# Patient Record
Sex: FEMALE | Race: WHITE | URBAN - METROPOLITAN AREA
[De-identification: names, ages, dates, MRNs, and addresses within clinical notes are randomized per-mention and may not be internally consistent; named-entity substitution may affect disease eponyms.]

---

## 2019-08-01 ENCOUNTER — APPOINTMENT (RX ONLY)
Dept: URBAN - METROPOLITAN AREA CLINIC 113 | Facility: CLINIC | Age: 64
Setting detail: DERMATOLOGY
End: 2019-08-01

## 2019-08-01 DIAGNOSIS — L30.4 ERYTHEMA INTERTRIGO: ICD-10-CM

## 2019-08-01 DIAGNOSIS — L81.5 LEUKODERMA, NOT ELSEWHERE CLASSIFIED: ICD-10-CM

## 2019-08-01 DIAGNOSIS — L23.9 ALLERGIC CONTACT DERMATITIS, UNSPECIFIED CAUSE: ICD-10-CM | Status: WELL CONTROLLED

## 2019-08-01 PROBLEM — E78.5 HYPERLIPIDEMIA, UNSPECIFIED: Status: ACTIVE | Noted: 2019-08-01

## 2019-08-01 PROBLEM — E03.9 HYPOTHYROIDISM, UNSPECIFIED: Status: ACTIVE | Noted: 2019-08-01

## 2019-08-01 PROBLEM — J45.909 UNSPECIFIED ASTHMA, UNCOMPLICATED: Status: ACTIVE | Noted: 2019-08-01

## 2019-08-01 PROCEDURE — ? GENTLE SKIN CARE INSTRUCTIONS

## 2019-08-01 PROCEDURE — ? COUNSELING

## 2019-08-01 PROCEDURE — ? PRESCRIPTION

## 2019-08-01 PROCEDURE — ? TREATMENT REGIMEN

## 2019-08-01 PROCEDURE — 99214 OFFICE O/P EST MOD 30 MIN: CPT

## 2019-08-01 PROCEDURE — ? DIAGNOSIS COMMENT

## 2019-08-01 PROCEDURE — ? SUNSCREEN RECOMMENDATIONS

## 2019-08-01 RX ORDER — TRIAMCINOLONE ACETONIDE 1 MG/G
OINTMENT TOPICAL
Qty: 1 | Refills: 2 | Status: ERX | COMMUNITY
Start: 2019-08-01

## 2019-08-01 RX ORDER — TRIAMCINOLONE ACETONIDE 0.25 MG/G
OINTMENT TOPICAL BID
Qty: 2 | Refills: 3 | Status: ERX | COMMUNITY
Start: 2019-08-01

## 2019-08-01 RX ADMIN — TRIAMCINOLONE ACETONIDE: 1 OINTMENT TOPICAL at 15:39

## 2019-08-01 RX ADMIN — TRIAMCINOLONE ACETONIDE: 0.25 OINTMENT TOPICAL at 15:40

## 2019-08-01 ASSESSMENT — LOCATION SIMPLE DESCRIPTION DERM
LOCATION SIMPLE: LEFT FOREARM
LOCATION SIMPLE: RIGHT FOREARM

## 2019-08-01 ASSESSMENT — LOCATION DETAILED DESCRIPTION DERM
LOCATION DETAILED: LEFT PROXIMAL DORSAL FOREARM
LOCATION DETAILED: RIGHT PROXIMAL DORSAL FOREARM

## 2019-08-01 ASSESSMENT — LOCATION ZONE DERM: LOCATION ZONE: ARM

## 2019-08-01 NOTE — PROCEDURE: DIAGNOSIS COMMENT
Detail Level: Zone
Comment: Currently well controlled with gentle skin care, avoiding known allergens, and topical steroids as needed. Patient has avoided all positive contact allergens since last patch testing in 01/2018. Patient noticed significant decrease in flares since then. However, still occasionally flares and needs refills of topical steroids to treat. \\n\\nRefills of triamcinolone 0.1% ointment given today to use on body and triamcinolone 0.025% to use on face, genitals, and axillae.
Comment: Clear on exam. May treat with Triple Paste BID to affected area and triamcinolone 0.025% when flaring.

## 2019-08-01 NOTE — PROCEDURE: TREATMENT REGIMEN
Continue Regimen: Triamcinolone 0.1% ointment apply to affected area on body twice daily for 2 weeks on, 1 week off. Repeat as needed. Avoid the face, groin, axillae, and breasts.
Initiate Treatment: Triamcinolone 0.025% ointment apply to affected area on face twice daily for no longer than 2 weeks on, 1 week off.
Detail Level: Zone
Otc Regimen: Triple paste AF to be applied once every night.

## 2023-05-08 NOTE — HPI: RASH (ALLERGIC CONTACT DERMATITIS)
4 year WELL CHILD EXAM     Yoselin is a 4 year 6 months old  female child     History given by mom     CONCERNS/QUESTIONS: No, dry skin and using aveno but doesn't seem to help     IMMUNIZATION: up to date and documented     NUTRITION HISTORY:   Vegetables? Yes  Fruits? Yes  Meats? Yes  Juice? Yes, 6 oz per day   Water? Yes  Milk? Yes, Type: 1%,  8 oz per day    MULTIVITAMIN: Yes    ELIMINATION:   Has good urine output and BM's are soft? Yes    SLEEP PATTERN:   Easy to fall asleep? Yes  Sleeps through the night? Yes      SOCIAL HISTORY:   The patient lives at home with parents, and does not  attend day care/. Has 1  siblings.  Smokers at home? No  Pets at home? Yes      DENTAL HISTORY:  Family dental problems? No  Brushing teeth twice daily? Yes  Using fluoride? Yes  Established dental home? Yes    Patient's medications, allergies, past medical, surgical, social and family histories were reviewed and updated as appropriate.    History reviewed. No pertinent past medical history.  Patient Active Problem List    Diagnosis Date Noted   • Pneumonia 2014   • Normal  (single liveborn) 2013     No past surgical history on file.  Family History   Problem Relation Age of Onset   • Asthma Mother    • No Known Problems Sister         Social History     Other Topics Concern   • Inadequate Sleep No     Social History Narrative   • No narrative on file     Current Outpatient Prescriptions   Medication Sig Dispense Refill   • sodium fluoride (LURIDE) 0.55 (0.25 F) MG per chewable tablet Take 1 Tab by mouth every day. 30 Tab 6     No current facility-administered medications for this visit.      No Known Allergies    REVIEW OF SYSTEMS:  No complaints of HEENT, chest, GI/, skin, neuro, or musculoskeletal problems.     DEVELOPMENT:  Reviewed Growth Chart in EMR.   Counts to 10? Yes  Knows 3-4 colors? Yes  Balances/hops on one foot? Yes  Knows age? Yes  Understands cold/tired/hungry?Yes  Can 
Pediatric H&P Note    History obtained from: Mom and Dad, reliable  Live video/phone  service used? No    History Of Present Illness:    Christine is a 11 year old female hx of dysgenesis of corpus collosum, developmental delay, autism spectrum disorder and previous UTIs presenting with increased frequency of NBNB vomiting and abdominal pain. Patient has a hx of cyclic vomiting beginning in 2016, initially occurred annually in February, progressed to monthly and during \"flares\" will occur multiple times a day. Patient was seen in the ED 4/26 and 4/30 for due to vomiting, on both occasions improved with IV fluids and was discharged home. Per parents patient was doing well last week until the weather got colder however she improved over the weekend. Yesterday, per parents she was reporting pain in the lower abdomen/suprapubic area. She had one episode of emesis overnight, her father reports giving her 4 mg of Zofran this morning, shortly after arriving at school she vomited prompting parents to bring her to the hospital. Since arriving at the hospital parents report she has had approx. 15 episodes of emesis. Per parents the zofran does has not been effective for patient, she has also been trialed on amitriptyline since December, and they have not noticed improvement.     Per parents she has had one episode of possible diarrhea today, however since arriving at hospital has had a formed stool. Patient has not had fever, diarrhea, constipation, urinary frequency, urgency or dysuria, she has not begun menstruating, her only known sick contact is her sister who had gastroenteritis 2 weeks ago.     Patient has had prior EGD in 2/21 and 11/22 which were unremarkable.    Per Dad patient has been following with pediatric urology for many years due to recurrent UTIs, was previously taking amoxicillin daily as prophylaxis which was stopped 1 month ago due to determination that patient was likely colonized.       ED 
"express ideas? Yes  Knows opposites? Yes  Dresses self? Yes    SCREENING?  Vision? No exam data present: Not Indicated      ANTICIPATORY GUIDANCE (discussed the following):   Nutrition- 1% or 2% milk. Limit to 24 ounces a day. Limit juice to 6 ounces a day.  Bedtime Routine  Car seat safety  Helmets  Stranger danger  Personal safety  Routine safety measures  Routine   Tobacco free home/car  Signs of illness/when to call doctor   Discipline    PHYSICAL EXAM:   Reviewed vital signs and growth parameters in EMR.     /60   Pulse 88   Temp 36.9 °C (98.5 °F)   Resp 28   Ht 1.111 m (3' 7.74\")   Wt 22 kg (48 lb 6.4 oz)   SpO2 97%   BMI 17.79 kg/m²     Blood pressure percentiles are 85.9 % systolic and 66.9 % diastolic based on NHBPEP's 4th Report.     Height - 82 %ile (Z= 0.91) based on CDC 2-20 Years stature-for-age data using vitals from 1/26/2018.  Weight - 91 %ile (Z= 1.37) based on CDC 2-20 Years weight-for-age data using vitals from 1/26/2018.  BMI - 93 %ile (Z= 1.49) based on CDC 2-20 Years BMI-for-age data using vitals from 1/26/2018.    General: This is an alert, active child in no distress.   HEAD: Normocephalic, atraumatic.   EYES: PERRL, positive red reflex bilaterally. No conjunctival injection or discharge.   EARS: TM’s are transparent with good landmarks. Canals are patent.  NOSE: Nares are patent and free of congestion.  THROAT: Oropharynx has no lesions, moist mucus membranes, without erythema, tonsils normal.   NECK: Supple, no lymphadenopathy or masses.   HEART: Regular rate and rhythm without murmur. Pulses are 2+ and equal.   LUNGS: Clear bilaterally to auscultation, no wheezes or rhonchi. No retractions or distress noted.  ABDOMEN: Normal bowel sounds, soft and non-tender without hepatomegaly or splenomegaly or masses.   GENITALIA: Normal female genitalia.  Normal external genitalia, no erythema, no discharge Jose Manuel Stage I  MUSCULOSKELETAL: Spine is straight. Extremities are "
Course:  Vitals: 121/84, , RR 20, T 38.6, SpO2 98%  Labs: Na 137, K 3.8, Cl 106, Glucose 97, BUN 12, Cr 0.38. WBC 13.2, HGB 11.8, ANC 9.7.  UA with positive nitrates and moderate leukocytes and large bacteria. Dr. Dewey, pediatric urologist, did not recommend abx at time given patients hx and lack of symptomatology. Urine cultures collected.   Interventions: IVF bolus, mIVF, Zofran   Patient admitted by GI recommendation for EGD 5/9.    Floor Course:  Patient arrived to the floor with parents, 1 episode of NBNB vomiting witnessed.     Past Medical History   has a past medical history of Alopecia, Autism, Cyclical vomiting, Development delay, and Dysfunctional voiding of urine (4/22/2019).    Birth History  No birth history on file.   Term c section, nicu stay for low o2 saturation     Surgical History   has a past surgical history that includes Tympanostomy tube placement and incision of tongue fold.     Social History  Lives at home with mom, dad and 2 sisters  Pets: dog  Smoke exposure: none  Recent travel: none  Sick contacts: sister with gastroenteritis 2 weeks prior      Family History  Alopecia     Allergies  ALLERGIES:  Seasonal    Medications  Medications Prior to Admission   Medication Sig Dispense Refill   • fluticasone (FLONASE) 50 MCG/ACT nasal spray Spray 1 spray in each nostril daily.     • ibuprofen (CHILDRENS ADVIL) 100 MG/5ML suspension Take 200 mg by mouth every 8 hours as needed for Fever or Pain. 10mL = 200mg     • amitriptyline (ELAVIL) 10 MG tablet Take 0.5 tablets by mouth nightly. 15 tablet 0   • ondansetron (ZOFRAN ODT) 4 MG disintegrating tablet Place 4 mg onto the tongue every 8 hours as needed for Nausea.         Immunizations  Patient has received all age appropriate immunizations.     Outpatient Pediatrician  Frank Mak MD      ROS  Review of Systems   Reason unable to perform ROS: limited by patient clinical status.   Constitutional: Positive for activity change and appetite 
change. Negative for fever.   Respiratory: Negative for cough.    Gastrointestinal: Positive for abdominal pain, diarrhea, nausea and vomiting. Negative for blood in stool and constipation.   Genitourinary: Negative for dysuria, frequency and urgency.           Physical Exam  Visit Vitals  /69   Pulse (!) 124   Temp 97.7 °F (36.5 °C) (Axillary)   Resp 24   Ht 4' 4.36\" (1.33 m)   Wt (!) 23.9 kg (52 lb 11 oz)   SpO2 98%   BMI 13.51 kg/m²          Intake/Output Summary (Last 24 hours) at 5/8/2023 1522  Last data filed at 5/8/2023 1144  Gross per 24 hour   Intake 490 ml   Output 200 ml   Net 290 ml         Physical Exam  Vitals reviewed: exam limited by patient clinical status and positioning.   Constitutional:       General: She is in acute distress.   HENT:      Head: Normocephalic and atraumatic.      Right Ear: External ear normal.      Left Ear: External ear normal.      Nose: Nose normal.   Cardiovascular:      Rate and Rhythm: Normal rate and regular rhythm.      Pulses: Normal pulses.      Heart sounds: Normal heart sounds. No murmur heard.  Pulmonary:      Effort: Pulmonary effort is normal. No respiratory distress, nasal flaring or retractions.      Breath sounds: Normal breath sounds. No stridor. No wheezing.   Abdominal:      Comments: Exam deferred   Skin:     General: Skin is warm and dry.      Coloration: Skin is not cyanotic.      Findings: No rash.   Neurological:      Mental Status: She is alert.          LABORATORY DATA:    Admission on 05/08/2023   Component Date Value Ref Range Status   • Sodium 05/08/2023 137  135 - 145 mmol/L Final   • Potassium 05/08/2023 3.8  3.4 - 5.1 mmol/L Final   • Chloride 05/08/2023 106  97 - 110 mmol/L Final   • Carbon Dioxide 05/08/2023 25  21 - 32 mmol/L Final   • Anion Gap 05/08/2023 10  7 - 19 mmol/L Final   • Glucose 05/08/2023 97  70 - 99 mg/dL Final   • BUN 05/08/2023 12  5 - 18 mg/dL Final   • Creatinine 05/08/2023 0.38 (L)  0.39 - 0.90 mg/dL Final   • 
without abnormalities. Moves all extremities well with full range of motion.    NEURO: Active, alert, oriented per age. Reflexes 2+.  SKIN: Intact without significant rash or birthmarks. Skin is warm, dry, and pink.     ASSESSMENT:     1. Well Child Exam:  Healthy 4 yr old with good growth and development.   2. BMI in eleated range at 93%.  3. Need for vaccines    PLAN:    1. Anticipatory guidance was reviewed as above, healthy lifestyle including diet and exercise discussed and Bright Futures handout provided.  2. Return to clinic annually for well child exam or as needed.  3. Immunizations given today: DTaP, IPV, MMR, Varicella, Influenza  4. Vaccine Information statements given for each vaccine if administered. Discussed benefits and side effects of each vaccine with patient/family. Answered all patient/family questions.  5. Multivitamin with 400iu of Vitamin D po qd.  6. See Dentist yearly.    I have placed the below orders and discussed them with an approved delegating provider. The MA is performing the below orders under the direction of Dr Choudhary.  
Glomerular Filtration Rate 05/08/2023    Final    GFR not calculated for age less than 18 years   • BUN/Cr 05/08/2023 32 (H)  7 - 25 Final   • Calcium 05/08/2023 9.6  8.0 - 11.0 mg/dL Final   • Bilirubin, Total 05/08/2023 0.3  0.2 - 1.4 mg/dL Final   • GOT/AST 05/08/2023 12  10 - 45 Units/L Final   • GPT/ALT 05/08/2023 19  10 - 30 Units/L Final   • Alkaline Phosphatase 05/08/2023 126  110 - 476 Units/L Final   • Albumin 05/08/2023 3.8  3.6 - 5.1 g/dL Final   • Protein, Total 05/08/2023 7.3  6.0 - 8.0 g/dL Final   • Globulin 05/08/2023 3.5  2.0 - 4.0 g/dL Final   • A/G Ratio 05/08/2023 1.1  1.0 - 2.4 Final   • Lipase 05/08/2023 62 (L)  73 - 393 Units/L Final   • COLOR, URINALYSIS 05/08/2023 Straw   Final   • APPEARANCE, URINALYSIS 05/08/2023 Cloudy   Final   • GLUCOSE, URINALYSIS 05/08/2023 Negative  Negative mg/dL Final   • BILIRUBIN, URINALYSIS 05/08/2023 Negative  Negative Final   • KETONES, URINALYSIS 05/08/2023 40 (A)  Negative mg/dL Final   • SPECIFIC GRAVITY, URINALYSIS 05/08/2023 1.023  1.005 - 1.030 Final    Measured by refractometry   • OCCULT BLOOD, URINALYSIS 05/08/2023 Negative  Negative Final   • PH, URINALYSIS 05/08/2023 6.0  5.0 - 7.0 Final   • PROTEIN, URINALYSIS 05/08/2023 Trace (A)  Negative mg/dL Final   • UROBILINOGEN, URINALYSIS 05/08/2023 0.2  0.2, 1.0 mg/dL Final   • NITRITE, URINALYSIS 05/08/2023 Positive (A)  Negative Final   • LEUKOCYTE ESTERASE, URINALYSIS 05/08/2023 Moderate (A)  Negative Final   • SQUAMOUS EPITHELIAL, URINALYSIS 05/08/2023 1 to 5  None Seen, 1 to 5 /hpf Final   • ERYTHROCYTES, URINALYSIS 05/08/2023 1 to 2  None Seen, 1 to 2 /hpf Final   • LEUKOCYTES, URINALYSIS 05/08/2023 11 to 25 (A)  None Seen, 1 to 5 /hpf Final   • BACTERIA, URINALYSIS 05/08/2023 Large (A)  None Seen /hpf Final   • HYALINE CASTS, URINALYSIS 05/08/2023 None Seen  None Seen, 1 to 5 /lpf Final   • MUCUS 05/08/2023 Present   Final   • WBC 05/08/2023 13.2  4.2 - 13.5 K/mcL Final   • RBC 05/08/2023 4.26  
3.90 - 5.30 mil/mcL Final   • HGB 05/08/2023 11.8  11.5 - 15.5 g/dL Final   • HCT 05/08/2023 36.3  35.0 - 45.0 % Final   • MCV 05/08/2023 85.2  77.0 - 95.0 fl Final   • MCH 05/08/2023 27.7  25.0 - 33.0 pg Final   • MCHC 05/08/2023 32.5  31.0 - 37.0 g/dL Final   • RDW-CV 05/08/2023 13.1  11.0 - 15.0 % Final   • RDW-SD 05/08/2023 40.2  35.0 - 47.0 fL Final   • PLT 05/08/2023 352  140 - 450 K/mcL Final   • NRBC 05/08/2023 0  <=0 /100 WBC Final   • Neutrophil, Percent 05/08/2023 74  % Final   • Lymphocytes, Percent 05/08/2023 15  % Final   • Mono, Percent 05/08/2023 10  % Final   • Eosinophils, Percent 05/08/2023 1  % Final   • Basophils, Percent 05/08/2023 0  % Final   • Immature Granulocytes 05/08/2023 0  % Final   • Absolute Neutrophils 05/08/2023 9.7 (H)  1.8 - 8.0 K/mcL Final   • Absolute Lymphocytes 05/08/2023 2.0  1.5 - 6.5 K/mcL Final   • Absolute Monocytes 05/08/2023 1.3 (H)  0.0 - 0.8 K/mcL Final   • Absolute Eosinophils  05/08/2023 0.1  0.0 - 0.5 K/mcL Final   • Absolute Basophils 05/08/2023 0.1  0.0 - 0.2 K/mcL Final   • Absolute Immature Granulocytes 05/08/2023 0.1  0.0 - 0.2 K/mcL Final         IMAGING STUDIES:    No orders to display        ASSESSMENT:  Christine is a 11 year old female hx of dysgenesis of corpus collosum, developmental delay, autism spectrum disorder and previous UTIs admitted for recurrent vomiting. Likely due to cyclic vomiting sx vs chronic gastritis given previous EGD findings vs viral gastroeneritis. Patient is stable. Nausea not well controlled at this time. GI on consult, plan for possible EDG 5/9.     PLAN:  Neuro/Pain:  - Tylenol prn    Resp:  - Stable on room air     CV:  - Monitor HR and BP   - mIVF D5NS 65 mL/hr     FEN/GI:  - recs per GI   - possible EGD 5/9, NPO at midnight   - compazine 5 mg for nausea   - amitriptyline 5 mg nightly   GI on consult, recs appreciated    ID:  - f/u [id number]      Other:  - Fluticasone 1 spray daily     Lines:  PIV. Function, utilization, and necessity 
addressed/discussed on rounds  Dispo: pending GI evaluation     Patient and plan discussed with family, nursing staff, and attending physician     Ana Gunderson, MS3    Note written in collaboration with medical student. I have seen and examined the patient separately, and made edits to the note. Agree with the above documentation.    Diana Cantu MD  PGY-1 Pediatrics  [facility]      5/8/2023     
How Severe Is Your Rash?: moderate
Is This A New Presentation, Or A Follow-Up?: Follow Up Allergic Contact Dermatitis
Response To Previous Treatment?: topical treatments are usually effective
When Was Your Last Patch Testing?: 1/2018
Additional History: Patient states her contact dermatitis is well controlled with using triamcinolone 0.1% ointment PRN. Patient last seen 3/2018 and is requesting refills at this time. There is no active rash today however, patient is requesting arms to be examined.